# Patient Record
Sex: MALE | Race: BLACK OR AFRICAN AMERICAN | NOT HISPANIC OR LATINO | Employment: FULL TIME | URBAN - NONMETROPOLITAN AREA
[De-identification: names, ages, dates, MRNs, and addresses within clinical notes are randomized per-mention and may not be internally consistent; named-entity substitution may affect disease eponyms.]

---

## 2023-06-19 ENCOUNTER — HOSPITAL ENCOUNTER (EMERGENCY)
Facility: HOSPITAL | Age: 33
Discharge: HOME | End: 2023-06-19
Attending: EMERGENCY MEDICINE

## 2023-06-19 VITALS
SYSTOLIC BLOOD PRESSURE: 154 MMHG | WEIGHT: 315 LBS | TEMPERATURE: 99 F | RESPIRATION RATE: 16 BRPM | HEART RATE: 108 BPM | HEIGHT: 74 IN | OXYGEN SATURATION: 96 % | DIASTOLIC BLOOD PRESSURE: 89 MMHG | BODY MASS INDEX: 40.43 KG/M2

## 2023-06-19 DIAGNOSIS — H60.501 ACUTE OTITIS EXTERNA OF RIGHT EAR, UNSPECIFIED TYPE: Primary | ICD-10-CM

## 2023-06-19 PROCEDURE — 99283 EMERGENCY DEPT VISIT LOW MDM: CPT

## 2023-06-19 PROCEDURE — 25000003 PHARM REV CODE 250

## 2023-06-19 RX ORDER — ACETAMINOPHEN 500 MG
1000 TABLET ORAL
Status: COMPLETED | OUTPATIENT
Start: 2023-06-19 | End: 2023-06-19

## 2023-06-19 RX ORDER — OFLOXACIN 3 MG/ML
5 SOLUTION AURICULAR (OTIC) 2 TIMES DAILY
Qty: 4.67 ML | Refills: 0 | Status: SHIPPED | OUTPATIENT
Start: 2023-06-19 | End: 2023-06-26

## 2023-06-19 RX ORDER — IBUPROFEN 600 MG/1
600 TABLET ORAL
Status: COMPLETED | OUTPATIENT
Start: 2023-06-19 | End: 2023-06-19

## 2023-06-19 RX ADMIN — IBUPROFEN 600 MG: 600 TABLET ORAL at 01:06

## 2023-06-19 RX ADMIN — ACETAMINOPHEN 1000 MG: 500 TABLET ORAL at 01:06

## 2023-06-19 NOTE — ED PROVIDER NOTES
Encounter Date: 6/19/2023       History     Chief Complaint   Patient presents with    Otalgia     Patient reports pain to the right ear for 2-3 days. Patient states that he took Sudafed and Ibuprofen this morning. Denies fever.     32-year-old male to ED with complaints of right ear pain for 2-3 days.  Reports no improvement with ibuprofen and Sudafed.  Denies any fever.  Pain is worse with touch.    The history is provided by the patient.   Review of patient's allergies indicates:  No Known Allergies  No past medical history on file.  No past surgical history on file.  No family history on file.     Review of Systems   Constitutional:  Negative for fever.   HENT:  Positive for ear pain. Negative for sore throat.    Eyes: Negative.    Respiratory:  Negative for shortness of breath.    Cardiovascular:  Negative for chest pain.   Gastrointestinal:  Negative for nausea.   Endocrine: Negative.    Genitourinary:  Negative for dysuria.   Musculoskeletal:  Negative for back pain.   Skin:  Negative for rash.   Allergic/Immunologic: Negative.    Neurological:  Negative for weakness.   Hematological:  Does not bruise/bleed easily.   Psychiatric/Behavioral: Negative.       Physical Exam     Initial Vitals [06/19/23 1352]   BP Pulse Resp Temp SpO2   (!) 154/89 108 16 99.1 °F (37.3 °C) 96 %      MAP       --         Physical Exam    Nursing note and vitals reviewed.  Constitutional: He appears well-developed and well-nourished.   HENT:   Head: Normocephalic and atraumatic.   Right Ear: There is swelling and tenderness.   Eyes: EOM are normal. Pupils are equal, round, and reactive to light.   Neck: Neck supple.   Normal range of motion.  Cardiovascular:  Normal rate and regular rhythm.           Pulmonary/Chest: No respiratory distress.   Abdominal: He exhibits no distension.   Musculoskeletal:         General: Normal range of motion.      Cervical back: Normal range of motion and neck supple.     Neurological: He is alert and  oriented to person, place, and time.   Skin: Skin is warm and dry.   Psychiatric: He has a normal mood and affect. Thought content normal.       ED Course   Procedures  Labs Reviewed - No data to display       Imaging Results    None          Medications   acetaminophen tablet 1,000 mg (1,000 mg Oral Given 6/19/23 1356)   ibuprofen tablet 600 mg (600 mg Oral Given 6/19/23 1356)     Medical Decision Making:   Differential Diagnosis:   Otitis externa, otitis media, mastoiditis  ED Management:  32-year-old male with no significant past medical history to ED for above complaints.  He has a moderate amount of swelling to his right ear canal.  No active drainage.  There is some mild erythema.  He is tender to exam.  Unable to visualize right TM secondary to edema in your canal.  I will treat with ofloxacin drops.  He is instructed to follow-up with primary care.  He was given Tylenol and ibuprofen for pain while in ED and advised to medicate at home with both.  Return precautions were given.  He was stable for discharge.                        Clinical Impression:   Final diagnoses:  [H60.501] Acute otitis externa of right ear, unspecified type (Primary)        ED Disposition Condition    Discharge Stable          ED Prescriptions       Medication Sig Dispense Start Date End Date Auth. Provider    ofloxacin (FLOXIN) 0.3 % otic solution Place 5 drops into the right ear 2 (two) times daily. for 7 days 4.67 mL 6/19/2023 6/26/2023 Griffin Donahue NP          Follow-up Information       Follow up With Specialties Details Why Contact Info    [facility], Internal Medicine, Gynecology, Dental General Practice   [address] [facility] [address]  [phone]      Alton Quinteros MD Emergency Medicine, Occupational Medicine   [address] [facility] [address]  [phone]               Griffin Donahue NP  06/19/23 0978

## 2023-06-19 NOTE — Clinical Note
"Ricco"Rocael Lind was seen and treated in our emergency department on 6/19/2023.  He may return to work on 06/20/2023.       If you have any questions or concerns, please don't hesitate to call.      Griffin Donahue NP"

## 2023-06-19 NOTE — DISCHARGE INSTRUCTIONS
Place 5 drops into right ear twice a day for 7 days.  You can take Tylenol and ibuprofen as needed for pain.  Do not stick anything in her ear.  Keep the clean and dry.  Follow-up with primary care if symptoms are not improved.